# Patient Record
Sex: MALE | Race: BLACK OR AFRICAN AMERICAN | Employment: FULL TIME | URBAN - METROPOLITAN AREA
[De-identification: names, ages, dates, MRNs, and addresses within clinical notes are randomized per-mention and may not be internally consistent; named-entity substitution may affect disease eponyms.]

---

## 2018-03-01 ENCOUNTER — APPOINTMENT (OUTPATIENT)
Dept: GENERAL RADIOLOGY | Facility: HOSPITAL | Age: 33
DRG: 195 | End: 2018-03-01
Attending: EMERGENCY MEDICINE
Payer: MEDICAID

## 2018-03-01 ENCOUNTER — HOSPITAL ENCOUNTER (INPATIENT)
Facility: HOSPITAL | Age: 33
LOS: 2 days | Discharge: HOME | DRG: 195 | End: 2018-03-04
Attending: EMERGENCY MEDICINE | Admitting: HOSPITALIST
Payer: MEDICAID

## 2018-03-01 DIAGNOSIS — J18.9 MULTIFOCAL PNEUMONIA: Primary | ICD-10-CM

## 2018-03-01 LAB
BASOPHILS # BLD: 0.1 K/UL (ref 0–0.2)
BASOPHILS NFR BLD: 1 %
EOSINOPHIL # BLD: 0.1 K/UL (ref 0–0.7)
EOSINOPHIL NFR BLD: 1 %
ERYTHROCYTE [DISTWIDTH] IN BLOOD BY AUTOMATED COUNT: 13.6 % (ref 11–15)
HCT VFR BLD AUTO: 41 % (ref 41–52)
HGB BLD-MCNC: 13.8 G/DL (ref 13.5–17.5)
LYMPHOCYTES # BLD: 1.9 K/UL (ref 1–4)
LYMPHOCYTES NFR BLD: 20 %
MCH RBC QN AUTO: 29.2 PG (ref 27–32)
MCHC RBC AUTO-ENTMCNC: 33.8 G/DL (ref 32–37)
MCV RBC AUTO: 86.4 FL (ref 80–100)
MONOCYTES # BLD: 0.8 K/UL (ref 0–1)
MONOCYTES NFR BLD: 8 %
NEUTROPHILS # BLD AUTO: 6.7 K/UL (ref 1.8–7.7)
NEUTROPHILS NFR BLD: 70 %
PLATELET # BLD AUTO: 593 K/UL (ref 140–400)
PMV BLD AUTO: 6.6 FL (ref 7.4–10.3)
RBC # BLD AUTO: 4.74 M/UL (ref 4.5–5.9)
WBC # BLD AUTO: 9.6 K/UL (ref 4–11)

## 2018-03-01 PROCEDURE — 71046 X-RAY EXAM CHEST 2 VIEWS: CPT | Performed by: EMERGENCY MEDICINE

## 2018-03-01 RX ORDER — ACETAMINOPHEN AND CODEINE PHOSPHATE 300; 30 MG/1; MG/1
1 TABLET ORAL ONCE
Status: COMPLETED | OUTPATIENT
Start: 2018-03-01 | End: 2018-03-01

## 2018-03-02 ENCOUNTER — APPOINTMENT (OUTPATIENT)
Dept: CT IMAGING | Facility: HOSPITAL | Age: 33
DRG: 195 | End: 2018-03-02
Attending: HOSPITALIST
Payer: MEDICAID

## 2018-03-02 ENCOUNTER — APPOINTMENT (OUTPATIENT)
Dept: ULTRASOUND IMAGING | Facility: HOSPITAL | Age: 33
DRG: 195 | End: 2018-03-02
Attending: HOSPITALIST
Payer: MEDICAID

## 2018-03-02 PROBLEM — J18.9 MULTIFOCAL PNEUMONIA: Status: ACTIVE | Noted: 2018-03-02

## 2018-03-02 LAB
ALBUMIN SERPL BCP-MCNC: 2.7 G/DL (ref 3.5–4.8)
ALBUMIN SERPL BCP-MCNC: 3.1 G/DL (ref 3.5–4.8)
ALBUMIN/GLOB SERPL: 0.7 {RATIO} (ref 1–2)
ALBUMIN/GLOB SERPL: 0.7 {RATIO} (ref 1–2)
ALP SERPL-CCNC: 103 U/L (ref 32–100)
ALP SERPL-CCNC: 89 U/L (ref 32–100)
ALT SERPL-CCNC: 56 U/L (ref 17–63)
ALT SERPL-CCNC: 66 U/L (ref 17–63)
ANION GAP SERPL CALC-SCNC: 4 MMOL/L (ref 0–18)
ANION GAP SERPL CALC-SCNC: 7 MMOL/L (ref 0–18)
AST SERPL-CCNC: 42 U/L (ref 15–41)
AST SERPL-CCNC: 49 U/L (ref 15–41)
BACTERIA UR QL AUTO: NEGATIVE /HPF
BASOPHILS # BLD: 0 K/UL (ref 0–0.2)
BASOPHILS NFR BLD: 0 %
BETA STREP GRP A SCREEN: NEGATIVE
BILIRUB SERPL-MCNC: 0.5 MG/DL (ref 0.3–1.2)
BILIRUB SERPL-MCNC: 0.6 MG/DL (ref 0.3–1.2)
BILIRUB UR QL: NEGATIVE
BUN SERPL-MCNC: 6 MG/DL (ref 8–20)
BUN SERPL-MCNC: 6 MG/DL (ref 8–20)
BUN/CREAT SERPL: 6.1 (ref 10–20)
BUN/CREAT SERPL: 6.2 (ref 10–20)
CALCIUM SERPL-MCNC: 8 MG/DL (ref 8.5–10.5)
CALCIUM SERPL-MCNC: 8.5 MG/DL (ref 8.5–10.5)
CHLORIDE SERPL-SCNC: 100 MMOL/L (ref 95–110)
CHLORIDE SERPL-SCNC: 108 MMOL/L (ref 95–110)
CLARITY UR: CLEAR
CO2 SERPL-SCNC: 25 MMOL/L (ref 22–32)
CO2 SERPL-SCNC: 25 MMOL/L (ref 22–32)
COLOR UR: YELLOW
CREAT SERPL-MCNC: 0.97 MG/DL (ref 0.5–1.5)
CREAT SERPL-MCNC: 0.99 MG/DL (ref 0.5–1.5)
CRP SERPL-MCNC: 8.9 MG/DL (ref 0–0.9)
D DIMER PPP FEU-MCNC: 3.44 MCG/ML (ref ?–0.5)
EOSINOPHIL # BLD: 0.1 K/UL (ref 0–0.7)
EOSINOPHIL NFR BLD: 1 %
ERYTHROCYTE [DISTWIDTH] IN BLOOD BY AUTOMATED COUNT: 13.7 % (ref 11–15)
ERYTHROCYTE [SEDIMENTATION RATE] IN BLOOD: 94 MM/HR (ref 0–15)
GLOBULIN PLAS-MCNC: 3.9 G/DL (ref 2.5–3.7)
GLOBULIN PLAS-MCNC: 4.4 G/DL (ref 2.5–3.7)
GLUCOSE SERPL-MCNC: 102 MG/DL (ref 70–99)
GLUCOSE SERPL-MCNC: 98 MG/DL (ref 70–99)
GLUCOSE UR-MCNC: NEGATIVE MG/DL
HCT VFR BLD AUTO: 38.7 % (ref 41–52)
HETEROPH AB SER QL: NEGATIVE
HGB BLD-MCNC: 12.9 G/DL (ref 13.5–17.5)
HGB UR QL STRIP.AUTO: NEGATIVE
KETONES UR-MCNC: NEGATIVE MG/DL
L PNEUMO AG UR QL: NEGATIVE
LACTATE SERPL-SCNC: 1.2 MMOL/L (ref 0.5–2.2)
LEUKOCYTE ESTERASE UR QL STRIP.AUTO: NEGATIVE
LYMPHOCYTES # BLD: 2.1 K/UL (ref 1–4)
LYMPHOCYTES NFR BLD: 23 %
MAGNESIUM SERPL-MCNC: 2 MG/DL (ref 1.8–2.5)
MCH RBC QN AUTO: 29.2 PG (ref 27–32)
MCHC RBC AUTO-ENTMCNC: 33.3 G/DL (ref 32–37)
MCV RBC AUTO: 87.7 FL (ref 80–100)
MONOCYTES # BLD: 0.7 K/UL (ref 0–1)
MONOCYTES NFR BLD: 8 %
NEUTROPHILS # BLD AUTO: 6.2 K/UL (ref 1.8–7.7)
NEUTROPHILS NFR BLD: 68 %
NITRITE UR QL STRIP.AUTO: NEGATIVE
OSMOLALITY UR CALC.SUM OF ELEC: 272 MOSM/KG (ref 275–295)
OSMOLALITY UR CALC.SUM OF ELEC: 282 MOSM/KG (ref 275–295)
PH UR: 5 [PH] (ref 5–8)
PLATELET # BLD AUTO: 553 K/UL (ref 140–400)
PMV BLD AUTO: 6.3 FL (ref 7.4–10.3)
POTASSIUM SERPL-SCNC: 3.9 MMOL/L (ref 3.3–5.1)
POTASSIUM SERPL-SCNC: 3.9 MMOL/L (ref 3.3–5.1)
PROCALCITONIN SERPL-MCNC: <0.05 NG/ML (ref ?–0.11)
PROT SERPL-MCNC: 6.6 G/DL (ref 5.9–8.4)
PROT SERPL-MCNC: 7.5 G/DL (ref 5.9–8.4)
PROT UR-MCNC: NEGATIVE MG/DL
RBC # BLD AUTO: 4.41 M/UL (ref 4.5–5.9)
RBC #/AREA URNS AUTO: 1 /HPF
SODIUM SERPL-SCNC: 132 MMOL/L (ref 136–144)
SODIUM SERPL-SCNC: 137 MMOL/L (ref 136–144)
SP GR UR STRIP: 1.02 (ref 1–1.03)
STREP PNEUMO ANTIGEN, URINE: NEGATIVE
UROBILINOGEN UR STRIP-ACNC: 4
VIT C UR-MCNC: 20 MG/DL
WBC # BLD AUTO: 9.1 K/UL (ref 4–11)
WBC #/AREA URNS AUTO: <1 /HPF

## 2018-03-02 PROCEDURE — 99253 IP/OBS CNSLTJ NEW/EST LOW 45: CPT | Performed by: INTERNAL MEDICINE

## 2018-03-02 PROCEDURE — 93970 EXTREMITY STUDY: CPT | Performed by: HOSPITALIST

## 2018-03-02 PROCEDURE — 99223 1ST HOSP IP/OBS HIGH 75: CPT | Performed by: HOSPITALIST

## 2018-03-02 PROCEDURE — 71260 CT THORAX DX C+: CPT | Performed by: HOSPITALIST

## 2018-03-02 RX ORDER — HEPARIN SODIUM 5000 [USP'U]/ML
5000 INJECTION, SOLUTION INTRAVENOUS; SUBCUTANEOUS EVERY 12 HOURS SCHEDULED
Status: DISCONTINUED | OUTPATIENT
Start: 2018-03-02 | End: 2018-03-04

## 2018-03-02 RX ORDER — PANTOPRAZOLE SODIUM 40 MG/1
40 TABLET, DELAYED RELEASE ORAL
Status: DISCONTINUED | OUTPATIENT
Start: 2018-03-02 | End: 2018-03-04

## 2018-03-02 RX ORDER — ONDANSETRON 2 MG/ML
4 INJECTION INTRAMUSCULAR; INTRAVENOUS EVERY 4 HOURS PRN
Status: DISCONTINUED | OUTPATIENT
Start: 2018-03-02 | End: 2018-03-04

## 2018-03-02 RX ORDER — IPRATROPIUM BROMIDE AND ALBUTEROL SULFATE 2.5; .5 MG/3ML; MG/3ML
3 SOLUTION RESPIRATORY (INHALATION)
Status: DISCONTINUED | OUTPATIENT
Start: 2018-03-02 | End: 2018-03-02

## 2018-03-02 RX ORDER — SODIUM CHLORIDE 9 MG/ML
INJECTION, SOLUTION INTRAVENOUS CONTINUOUS
Status: DISPENSED | OUTPATIENT
Start: 2018-03-02 | End: 2018-03-02

## 2018-03-02 RX ORDER — ACETAMINOPHEN 325 MG/1
650 TABLET ORAL EVERY 6 HOURS PRN
Status: DISCONTINUED | OUTPATIENT
Start: 2018-03-02 | End: 2018-03-04

## 2018-03-02 RX ORDER — SODIUM CHLORIDE 9 MG/ML
INJECTION, SOLUTION INTRAVENOUS CONTINUOUS
Status: DISCONTINUED | OUTPATIENT
Start: 2018-03-02 | End: 2018-03-04

## 2018-03-02 RX ORDER — IPRATROPIUM BROMIDE AND ALBUTEROL SULFATE 2.5; .5 MG/3ML; MG/3ML
3 SOLUTION RESPIRATORY (INHALATION)
Status: DISCONTINUED | OUTPATIENT
Start: 2018-03-02 | End: 2018-03-04

## 2018-03-02 RX ORDER — AZITHROMYCIN 250 MG/1
500 TABLET, FILM COATED ORAL EVERY 24 HOURS
Status: DISCONTINUED | OUTPATIENT
Start: 2018-03-03 | End: 2018-03-04

## 2018-03-02 NOTE — PLAN OF CARE
Problem: Patient/Family Goals  Goal: Patient/Family Long Term Goal  Patient's Long Term Goal: to be discharged home  Interventions:  - implement MD orders    Monitor pt status  - See additional Care Plan goals for specific interventions   Outcome: Progress fall precautions as indicated by assessment.  - Educate pt/family on patient safety including physical limitations  - Instruct pt to call for assistance with activity based on assessment  - Modify environment to reduce risk of injury  - Provide assistive d Monitor for signs/symptoms of CO2 retention  Outcome: Progressing

## 2018-03-02 NOTE — ED PROVIDER NOTES
Patient Seen in: [facility] Emergency Department    History   Patient presents with:  Dyspnea IDRIS SOB (respiratory)    Stated Complaint: Flu-like symptoms     HPI    34 yo M without PMH/PSH presenting for evaluation of 4+ weeks of ongoing and interm normal. RLL rhonchi. .  Abdominal: Soft. Nontender. Musculoskeletal: No gross deformity. BLE with trace edema. Neurological: Alert. Skin: Skin is warm. Psychiatric: Cooperative. Nursing note and vitals reviewed.         ED Course     Labs Reviewed   C addressed. If you are not the intended recipient of this report, you are hereby notified that any copying, distribution, dissemination or action taken in relation to the contents of this report is strictly prohibited and may be unlawful.  If you have receiv

## 2018-03-02 NOTE — SEPSIS REASSESSMENT
[facility]  HOSPITALIST ADMIT NOTE    33year old male with no significant PMH  presents with polyarthralgias, cough fever chills . Available medical records partially reviewed. Dictation to follow.     Long Robles M.D.  3/2/2018

## 2018-03-02 NOTE — ED NOTES
Pt reports to ED with cold/flu like symptoms intermittently for the past month. Pt has had a dry cough, leg swelling, and fevers. Pt denies and N/V/D, has been eating and drinking fine. Pt states that the cough hurts his chest. Pt vitals WNL.

## 2018-03-02 NOTE — PROGRESS NOTES
[facility] Pharmacy Note: Route Optimization for Azithromycin [facility])    Patient is currently on Azithromycin (ZITHROMAX) 500 mg IV every 24 hours.    The patient meets the criteria to convert to the oral equivalent as established by the IV to Oral conversion

## 2018-03-02 NOTE — CONSULTS
Pulmonary/Critical Care Consult Note    HPI:   Riddhi Roberts is a 33year old male with Patient presents with:  Dyspnea IDRIS SOB (respiratory)    Keli Walshwesley    Pt is 34 yo with no PMH who was admitted with dx of PNA and abnl CXR.  Pt report cold 2 week (ISOVUE-M) 76 % injection 100 mL 100 mL Intravenous ONCE PRN   [COMPLETED] sodium chloride 0.9% IV bolus 1,000 mL 1,000 mL Intravenous Once   [COMPLETED] Acetaminophen-Codeine #3 (TYLENOL #3) 300-30 MG tab 1 tablet 1 tablet Oral Once           Allergies: + multifocal airspace opacities       ASSESSMENT/PLAN:     Cough, pleuritic CP, SOB  Also fever, aches/pain  Story c/w viral URI and then bacterial PNA  However the lack of WBC is intriguing as well as possible high risk behaviors and     The density a

## 2018-03-02 NOTE — H&P
[facility]    PATIENT'S NAME: Cristhian Neal PHYSICIAN: Yomaira Muñoz MD   PATIENT ACCOUNT#:   [id number]    LOCATION:  [address] [place] [address] RECORD #:   [id number]       YOB: 1985  ADMISSION DATE:       03/01/2 level was 1.2. His mono test was negative. Rapid strep screen was negative. Chest x-ray revealed extensive bilateral airspace disease more prominent on the left than on the right.   Pulmonary was consulted for assistance with management of the patient's There was no sinus tenderness. Mucous membranes were dry. Oropharynx was crowded. NECK:  Supple. LUNGS:  Bilateral scattered rhonchi. No wheezes currently, and the patient was taking shallow respirations. HEART:  Regular rate and rhythm.   Normal S1, strep as an etiology for his current respiratory illness. Urine for Legionella and mycoplasma studies were also sent. 5.   Obesity. Weight loss discussed.   The patient had been exercising at the gym but had been unable to do so because of the pain he wa

## 2018-03-02 NOTE — PROGRESS NOTES
[facility] Pharmacy Note: Antimicrobial Weight Dose Adjustment for: piperacillin/tazobactam (Sarah Chatterjee)    Macy Walden is a 33year old male who has been prescribed piperacillin/tazobactam (ZOSYN) 3.375 gm every 8 hours.   CrCl is estimated creatinine clearance i

## 2018-03-02 NOTE — PAYOR COMM NOTE
--------------  ADMISSION REVIEW     Payor: Payal Barragan #:  [id number]  Authorization Number: PENDING CLINICALS     Admit date: 3/2/18  Admit time: 0300       Patient Seen in: [facility] Emergency Department    History   Patient presents wit He does have phlegm, which he intentionally does not cough up because coughing hurts his chest when he does it.   He has felt hot and chilled at home with some shivering, and has had some sweating at night; but it does not sound like he has actually checked His appetite has been poor. He denies any melena, hematochezia, or hematuria. There were no additional pertinent positives or negatives on the 12-point review of systems except as listed in the History of Present Illness.       PHYSICAL EXAMINATION:    VI also obtain lower extremity venous Dopplers. Continue subcutaneous heparin for now, adjust as needed based on the above testing. 3.   Mildly elevated liver function tests. We will recheck in the morning. This elevation could represent sepsis.   The tiana (DUONEB) nebulizer solution 3 mL     Date Action Dose Route     3/2/2018 1354 Given 3 mL Nebulization       Pantoprazole Sodium (PROTONIX) EC tab 40 mg     Date Action Dose Route     3/2/2018 0910 Given 40 mg Oral       Piperacillin Sod-Tazobactam So (Dasilva Locket

## 2018-03-03 LAB
ANION GAP SERPL CALC-SCNC: 9 MMOL/L (ref 0–18)
BASOPHILS # BLD: 0.1 K/UL (ref 0–0.2)
BASOPHILS NFR BLD: 1 %
BUN SERPL-MCNC: 6 MG/DL (ref 8–20)
BUN/CREAT SERPL: 6.5 (ref 10–20)
CALCIUM SERPL-MCNC: 8.5 MG/DL (ref 8.5–10.5)
CHLORIDE SERPL-SCNC: 102 MMOL/L (ref 95–110)
CO2 SERPL-SCNC: 22 MMOL/L (ref 22–32)
CREAT SERPL-MCNC: 0.93 MG/DL (ref 0.5–1.5)
EOSINOPHIL # BLD: 0.1 K/UL (ref 0–0.7)
EOSINOPHIL NFR BLD: 1 %
ERYTHROCYTE [DISTWIDTH] IN BLOOD BY AUTOMATED COUNT: 13.9 % (ref 11–15)
GLUCOSE SERPL-MCNC: 101 MG/DL (ref 70–99)
HCT VFR BLD AUTO: 37.7 % (ref 41–52)
HGB BLD-MCNC: 12.7 G/DL (ref 13.5–17.5)
LYMPHOCYTES # BLD: 1.7 K/UL (ref 1–4)
LYMPHOCYTES NFR BLD: 18 %
MAGNESIUM SERPL-MCNC: 2.1 MG/DL (ref 1.8–2.5)
MCH RBC QN AUTO: 29.1 PG (ref 27–32)
MCHC RBC AUTO-ENTMCNC: 33.7 G/DL (ref 32–37)
MCV RBC AUTO: 86.3 FL (ref 80–100)
MONOCYTES # BLD: 0.8 K/UL (ref 0–1)
MONOCYTES NFR BLD: 8 %
NEUTROPHILS # BLD AUTO: 7.1 K/UL (ref 1.8–7.7)
NEUTROPHILS NFR BLD: 73 %
OSMOLALITY UR CALC.SUM OF ELEC: 274 MOSM/KG (ref 275–295)
PLATELET # BLD AUTO: 484 K/UL (ref 140–400)
PMV BLD AUTO: 6.1 FL (ref 7.4–10.3)
POTASSIUM SERPL-SCNC: 4.1 MMOL/L (ref 3.3–5.1)
RBC # BLD AUTO: 4.37 M/UL (ref 4.5–5.9)
SODIUM SERPL-SCNC: 133 MMOL/L (ref 136–144)
WBC # BLD AUTO: 9.7 K/UL (ref 4–11)

## 2018-03-03 PROCEDURE — 99232 SBSQ HOSP IP/OBS MODERATE 35: CPT | Performed by: INTERNAL MEDICINE

## 2018-03-03 PROCEDURE — 99233 SBSQ HOSP IP/OBS HIGH 50: CPT | Performed by: HOSPITALIST

## 2018-03-03 RX ORDER — 0.9 % SODIUM CHLORIDE 0.9 %
VIAL (ML) INJECTION
Status: COMPLETED
Start: 2018-03-03 | End: 2018-03-03

## 2018-03-03 NOTE — PLAN OF CARE
Problem: Patient/Family Goals  Goal: Patient/Family Long Term Goal  Patient's Long Term Goal: to be discharged home  Interventions:  - implement MD orders    Monitor pt status  - See additional Care Plan goals for specific interventions    Outcome: Progres [place] fall precautions as indicated by assessment.  - Educate pt/family on patient safety including physical limitations  - Instruct pt to call for assistance with activity based on assessment  - Modify environment to reduce risk of injury  - Provide a

## 2018-03-03 NOTE — PROGRESS NOTES
[facility]    Progress Note      Assessment and Plan:   1. Multifocal pneumonitis–patient has bilateral left greater than right infiltrates with a normal white blood cell count but high sedimentation rate.   He has received a day of antibio

## 2018-03-03 NOTE — PROGRESS NOTES
[facility]    Progress Note    Mirna Henriquez Patient Status:  Inpatient    1985 MRN [id number]   Location [facility] 5SW/SE Attending Tereza Frederick MD   Hosp Day # 1 PCP STEVIE Koch       Subjective:   Mirna Henriquez (cpt=[id number])    Result Date: 3/2/2018  CONCLUSION:  1. Extensive bilateral airspace disease left more than right as discussed above suggesting bilateral pneumonia. Correlate clinically and followup studies are advised.    The examination was read on call by

## 2018-03-04 VITALS
HEART RATE: 99 BPM | HEIGHT: 72 IN | RESPIRATION RATE: 20 BRPM | WEIGHT: 272.31 LBS | OXYGEN SATURATION: 98 % | BODY MASS INDEX: 36.88 KG/M2 | TEMPERATURE: 97 F | DIASTOLIC BLOOD PRESSURE: 71 MMHG | SYSTOLIC BLOOD PRESSURE: 122 MMHG

## 2018-03-04 LAB
ANION GAP SERPL CALC-SCNC: 7 MMOL/L (ref 0–18)
BASOPHILS # BLD: 0 K/UL (ref 0–0.2)
BASOPHILS NFR BLD: 0 %
BUN SERPL-MCNC: 4 MG/DL (ref 8–20)
BUN/CREAT SERPL: 4.3 (ref 10–20)
CALCIUM SERPL-MCNC: 8.4 MG/DL (ref 8.5–10.5)
CHLORIDE SERPL-SCNC: 104 MMOL/L (ref 95–110)
CO2 SERPL-SCNC: 22 MMOL/L (ref 22–32)
CREAT SERPL-MCNC: 0.94 MG/DL (ref 0.5–1.5)
EOSINOPHIL # BLD: 0.1 K/UL (ref 0–0.7)
EOSINOPHIL NFR BLD: 1 %
ERYTHROCYTE [DISTWIDTH] IN BLOOD BY AUTOMATED COUNT: 14 % (ref 11–15)
GLUCOSE SERPL-MCNC: 104 MG/DL (ref 70–99)
HCT VFR BLD AUTO: 36.4 % (ref 41–52)
HGB BLD-MCNC: 12.5 G/DL (ref 13.5–17.5)
LYMPHOCYTES # BLD: 1.9 K/UL (ref 1–4)
LYMPHOCYTES NFR BLD: 19 %
MAGNESIUM SERPL-MCNC: 1.9 MG/DL (ref 1.8–2.5)
MCH RBC QN AUTO: 29.5 PG (ref 27–32)
MCHC RBC AUTO-ENTMCNC: 34.2 G/DL (ref 32–37)
MCV RBC AUTO: 86.3 FL (ref 80–100)
MONOCYTES # BLD: 0.6 K/UL (ref 0–1)
MONOCYTES NFR BLD: 6 %
NEUTROPHILS # BLD AUTO: 7.5 K/UL (ref 1.8–7.7)
NEUTROPHILS NFR BLD: 74 %
OSMOLALITY UR CALC.SUM OF ELEC: 273 MOSM/KG (ref 275–295)
PLATELET # BLD AUTO: 467 K/UL (ref 140–400)
PMV BLD AUTO: 6 FL (ref 7.4–10.3)
POTASSIUM SERPL-SCNC: 4 MMOL/L (ref 3.3–5.1)
RBC # BLD AUTO: 4.22 M/UL (ref 4.5–5.9)
SODIUM SERPL-SCNC: 133 MMOL/L (ref 136–144)
WBC # BLD AUTO: 10.1 K/UL (ref 4–11)

## 2018-03-04 PROCEDURE — 99239 HOSP IP/OBS DSCHRG MGMT >30: CPT | Performed by: HOSPITALIST

## 2018-03-04 PROCEDURE — 99232 SBSQ HOSP IP/OBS MODERATE 35: CPT | Performed by: INTERNAL MEDICINE

## 2018-03-04 RX ORDER — AMOXICILLIN AND CLAVULANATE POTASSIUM 875; 125 MG/1; MG/1
1 TABLET, FILM COATED ORAL 2 TIMES DAILY
Qty: 14 TABLET | Refills: 0 | Status: SHIPPED | OUTPATIENT
Start: 2018-03-04 | End: 2018-03-14

## 2018-03-04 RX ORDER — ALBUTEROL SULFATE 90 UG/1
2 AEROSOL, METERED RESPIRATORY (INHALATION) EVERY 6 HOURS PRN
Qty: 1 INHALER | Refills: 0 | Status: SHIPPED | OUTPATIENT
Start: 2018-03-04

## 2018-03-04 RX ORDER — IPRATROPIUM BROMIDE AND ALBUTEROL SULFATE 2.5; .5 MG/3ML; MG/3ML
3 SOLUTION RESPIRATORY (INHALATION)
Status: DISCONTINUED | OUTPATIENT
Start: 2018-03-04 | End: 2018-03-04

## 2018-03-04 RX ORDER — LEVOFLOXACIN 750 MG/1
750 TABLET ORAL DAILY
Qty: 7 TABLET | Refills: 0 | Status: SHIPPED | OUTPATIENT
Start: 2018-03-04 | End: 2018-03-04

## 2018-03-04 RX ORDER — AZITHROMYCIN 250 MG/1
250 TABLET, FILM COATED ORAL DAILY
Qty: 1 PACKAGE | Refills: 0 | Status: SHIPPED | OUTPATIENT
Start: 2018-03-04

## 2018-03-04 RX ORDER — IPRATROPIUM BROMIDE AND ALBUTEROL SULFATE 2.5; .5 MG/3ML; MG/3ML
3 SOLUTION RESPIRATORY (INHALATION) EVERY 6 HOURS PRN
Status: DISCONTINUED | OUTPATIENT
Start: 2018-03-04 | End: 2018-03-04

## 2018-03-04 RX ORDER — AZITHROMYCIN 250 MG/1
500 TABLET, FILM COATED ORAL DAILY
Status: DISCONTINUED | OUTPATIENT
Start: 2018-03-04 | End: 2018-03-04

## 2018-03-04 NOTE — DISCHARGE SUMMARY
[facility]    Discharge Summary    Luly Nassar Patient Status:  Inpatient    1985 MRN [id number]   Location [facility] 5SW/SE Attending Petra Beard MD   Hosp Day # 2 PCP Marcella Deutsch     Date of Admission: 3/1/201 have phlegm, which he intentionally does not cough up because coughing hurts his chest when he does it.   He has felt hot and chilled at home with some shivering, and has had some sweating at night; but it does not sound like he has actually checked his tem n/a    Discharge Condition: Good    Discharge Medications:      Discharge Medications      START taking these medications      Instructions Prescription details   Albuterol Sulfate  (90 Base) MCG/ACT Aers      Inhale 2 puffs into the lungs every 6 (

## 2018-03-04 NOTE — PLAN OF CARE
Problem: Patient/Family Goals  Goal: Patient/Family Long Term Goal  Patient's Long Term Goal: to be discharged home  Interventions:  - implement MD orders    Monitor pt status  - See additional Care Plan goals for specific interventions    Outcome: Progres [place] fall precautions as indicated by assessment.  - Educate pt/family on patient safety including physical limitations  - Instruct pt to call for assistance with activity based on assessment  - Modify environment to reduce risk of injury  - Provide a anxiety  - Monitor for signs/symptoms of CO2 retention   Outcome: Progressing

## 2018-03-04 NOTE — PLAN OF CARE
Problem: Patient/Family Goals  Goal: Patient/Family Long Term Goal  Patient's Long Term Goal: to be discharged home  Interventions:  - implement MD orders    Monitor pt status  - See additional Care Plan goals for specific interventions    Outcome: Progres [place] fall precautions as indicated by assessment.  - Educate pt/family on patient safety including physical limitations  - Instruct pt to call for assistance with activity based on assessment  - Modify environment to reduce risk of injury  - Provide a anxiety  - Monitor for signs/symptoms of CO2 retention   Outcome: Progressing      Comments: States breathing is better today, no fevers. Continues on po zithromax and IV zosyn.

## 2018-03-04 NOTE — PROGRESS NOTES
[facility]    Progress Note      Assessment and Plan:   1. Multifocal pneumonitis– the patient is breathing more comfortably. He still has a couple left basilar crackles.   He otherwise feels well enough to go home and I think is reasonabl

## 2018-03-04 NOTE — PLAN OF CARE
Problem: Patient/Family Goals  Goal: Patient/Family Long Term Goal  Patient's Long Term Goal: to be discharged home  Interventions:  - implement MD orders    Monitor pt status  - See additional Care Plan goals for specific interventions    Outcome: Adequat behaviors that affect risk of falls.   - [place] fall precautions as indicated by assessment.  - Educate pt/family on patient safety including physical limitations  - Instruct pt to call for assistance with activity based on assessment  - Modify environme Respiratory Therapy support as indicated  - Manage/alleviate anxiety  - Monitor for signs/symptoms of CO2 retention   Outcome: Adequate for Discharge      Comments: Alfred Potter states his breathing is \"good\" today and it \"was easy to breath\" when he woke up

## 2018-03-04 NOTE — PROGRESS NOTES
[facility]    Progress Note    Keyona Bryant Patient Status:  Inpatient    1985 MRN [id number]   Location [facility] 5SW/SE Attending Vidal Dale MD   Hosp Day # 2 PCP STEVIE Henson       Subjective:   Keyona Bryant 3/2/2018  CONCLUSION: There is no evidence of occlusive or nonocclusive thrombosis in the visualized portions  of the deep venous systems in both lower extremities.          Ct Chest Pain/pe (iv Only) Em    Result Date: 3/2/2018  CONCLUSION:  1. Multifocal

## 2018-03-05 ENCOUNTER — TELEPHONE (OUTPATIENT)
Dept: MEDSURG UNIT | Facility: HOSPITAL | Age: 33
End: 2018-03-05

## 2018-03-05 LAB
MAYO HISTOPLASMA AG RESULT: NEGATIVE
MAYO HISTOPLASMA AG VALUE: 0 NG/ML

## 2018-03-05 NOTE — PAYOR COMM NOTE
--------------  DISCHARGE REVIEW    Secondary Payor: N/A  Subscriber #:    Secondary Payor Authorization Number: N/A      Admit date: 3/2/18  Admit time:  0300  Discharge Date: 3/4/2018  1:22 PM     Admitting Physician: Valerie Shah MD  Attending edema  Psychiatric: calm        History of Present Illness:[FG.2]   Per Dr. Kelsi Preston:  This is a very pleasant, 81-year-old gentleman with no chronic medical problems.   He denies any history of diabetes, high blood pressure, whether or not we should empirically start vancomycin for the possibility of a postviral pneumonia. He discussed this with Pulmonary, and the recommendation was to continue Zosyn and Zithromax.   The patient was admitted to remote telemetry for further ede AM   Attribution Key     FG. 1 - Kalia Pat MD on 3/4/2018  9:22 AM   FG. 2 - Kalia Pat MD on 3/4/2018  9:23 AM   FG. 3 - Kalia Pat MD on 3/4/2018  9:43 AM                    REVIEWER COMMENTS

## 2018-03-05 NOTE — PAYOR COMM NOTE
3/3    Filed: 3/3/2018  4:31 PM Date of Service: 3/3/2018  4:27 PM Status: Signed   : Christophe Arambula MD (Physician)   []Manual[]Template  []Copied  [facility]     Progress Note           Kathy Flores Patient Status:  Inpatie AST 42 (H) 03/02/2018   ALT 56 03/02/2018   DDIMER 3.44 (H) 03/02/2018   ESRML 94 (H) 03/02/2018   CRP 8.9 (H) 03/02/2018   MG 2.1 03/03/2018         Xr Chest Pa + Lat Chest ([id number])     Result Date: 3/2/2018  CONCLUSION:  1.  Extensive bilateral airspac Recommendations: Okay to discharge today with course of Levaquin. I discussed with primary care. The patient should see Dr. Venkat Kingston in the office in 1 week with a repeat chest x-ray. Await antineutrophil cytoplasmic antibodies.     2.   DVT prophyl

## 2018-03-07 LAB
MYELOPEROX ANTIBODIES, IGG: 0 AU/ML
SERINE PROTEASE3, IGG: 0 AU/ML

## 2018-12-26 ENCOUNTER — HOSPITAL ENCOUNTER (EMERGENCY)
Facility: HOSPITAL | Age: 33
Discharge: HOME | End: 2018-12-26
Attending: PHYSICIAN ASSISTANT
Payer: MEDICAID

## 2018-12-26 VITALS
RESPIRATION RATE: 18 BRPM | BODY MASS INDEX: 36.87 KG/M2 | OXYGEN SATURATION: 98 % | HEIGHT: 72 IN | SYSTOLIC BLOOD PRESSURE: 153 MMHG | TEMPERATURE: 97 F | DIASTOLIC BLOOD PRESSURE: 98 MMHG | WEIGHT: 272.25 LBS | HEART RATE: 76 BPM

## 2018-12-26 DIAGNOSIS — R36.9 PENILE DISCHARGE: Primary | ICD-10-CM

## 2018-12-26 DIAGNOSIS — Z20.2 POSSIBLE EXPOSURE TO STD: ICD-10-CM

## 2018-12-26 DIAGNOSIS — R03.0 ELEVATED BLOOD PRESSURE READING: ICD-10-CM

## 2018-12-26 DIAGNOSIS — N39.0 URINARY TRACT INFECTION WITHOUT HEMATURIA, SITE UNSPECIFIED: ICD-10-CM

## 2018-12-26 PROCEDURE — 99284 EMERGENCY DEPT VISIT MOD MDM: CPT

## 2018-12-26 PROCEDURE — 87086 URINE CULTURE/COLONY COUNT: CPT | Performed by: PHYSICIAN ASSISTANT

## 2018-12-26 PROCEDURE — 81001 URINALYSIS AUTO W/SCOPE: CPT

## 2018-12-26 PROCEDURE — 87086 URINE CULTURE/COLONY COUNT: CPT

## 2018-12-26 PROCEDURE — 87491 CHLMYD TRACH DNA AMP PROBE: CPT | Performed by: PHYSICIAN ASSISTANT

## 2018-12-26 PROCEDURE — 81001 URINALYSIS AUTO W/SCOPE: CPT | Performed by: PHYSICIAN ASSISTANT

## 2018-12-26 PROCEDURE — 96372 THER/PROPH/DIAG INJ SC/IM: CPT

## 2018-12-26 PROCEDURE — 87591 N.GONORRHOEAE DNA AMP PROB: CPT | Performed by: PHYSICIAN ASSISTANT

## 2018-12-26 RX ORDER — CEPHALEXIN 500 MG/1
500 CAPSULE ORAL 3 TIMES DAILY
Qty: 21 CAPSULE | Refills: 0 | Status: SHIPPED | OUTPATIENT
Start: 2018-12-26 | End: 2019-01-02

## 2018-12-26 RX ORDER — AZITHROMYCIN 250 MG/1
1000 TABLET, FILM COATED ORAL ONCE
Status: COMPLETED | OUTPATIENT
Start: 2018-12-26 | End: 2018-12-26

## 2018-12-27 NOTE — ED NOTES
Pt  2 days ago began having pelvic pain and urinary frequency, dysuria and urgency. [facility] also has been having white penile discharge. [facility] has been sexually active recently.

## 2018-12-27 NOTE — ED PROVIDER NOTES
Patient Seen in: [facility]    History   Patient presents with:  Male  Problem    Stated Complaint: Penis pain, urinary symptoms    HPI    Gage Villareal is a 29year old male who presents with chief complaint of dysuria. 100 %   O2 Device None (Room air)       Current:BP (!) 153/98   Pulse 76   Temp 97.4 °F (36.3 °C) (Oral)   Resp 18   Ht 182.9 cm (6')   Wt 123.5 kg   SpO2 98%   BMI 36.93 kg/m²     PULSE OX within normal limits on room air as interpreted by this provider. All other components within normal limits   URINE CULTURE, ROUTINE   CHLAMYDIA/GONOCOCCUS, JENELLE       MDM       Physical exam remained stable over serial reexaminations as previously documented.   Results reviewed and need for follow-up discussed with mayi

## 2020-06-24 ENCOUNTER — APPOINTMENT (OUTPATIENT)
Dept: GENERAL RADIOLOGY | Facility: HOSPITAL | Age: 35
End: 2020-06-24
Attending: PHYSICIAN ASSISTANT
Payer: MEDICAID

## 2020-06-24 PROCEDURE — 71045 X-RAY EXAM CHEST 1 VIEW: CPT | Performed by: PHYSICIAN ASSISTANT

## 2020-06-24 NOTE — ED NOTES
Patient reports taking Risperdal at 2100 and 0400, then drank a monster at 0700. Patient reports that he felt dazed and SOB. Patient denies symptoms at this time. Patient is alert, oriented x4, in no apparent distress. Will continue to monitor.

## 2020-06-24 NOTE — ED PROVIDER NOTES
Patient Seen in: [facility] Emergency Department    History   Patient presents with: Anxiety/Panic attack    Stated Complaint: allergic reation to meds     HPI    78-year-old male presents with chief complaint of anxiety. Onset 0700 today.   Mckayla allergic reation to meds   Other systems are as noted in HPI. Constitutional and vital signs reviewed. All other systems reviewed and negative except as noted above. PSFH elements reviewed from today and agreed except as otherwise stated in HPI. dry.  No obvious bruising. No obvious rash. ED Course     Labs Reviewed   RAPID SARS-COV-2 BY PCR - Normal     EKG    Rate, intervals and axes as noted on EKG Report.   Rate: 75  Rhythm: Sinus Rhythm  Reading: No STEMI, interpreted by ED physician

## 2020-06-24 NOTE — ED INITIAL ASSESSMENT (HPI)
Pt states he took Risperdal today at 4am ad went to work for the first time in a month. Pt believes he's having a reaction to medication because he felt like he was sleepwalking felt nauseous and SOB.

## 2021-12-28 ENCOUNTER — APPOINTMENT (OUTPATIENT)
Dept: CT IMAGING | Facility: HOSPITAL | Age: 36
End: 2021-12-28
Attending: EMERGENCY MEDICINE
Payer: MEDICAID

## 2021-12-28 ENCOUNTER — HOSPITAL ENCOUNTER (EMERGENCY)
Facility: HOSPITAL | Age: 36
Discharge: HOME | End: 2021-12-28
Attending: EMERGENCY MEDICINE
Payer: MEDICAID

## 2021-12-28 VITALS
SYSTOLIC BLOOD PRESSURE: 138 MMHG | BODY MASS INDEX: 34.54 KG/M2 | DIASTOLIC BLOOD PRESSURE: 91 MMHG | TEMPERATURE: 98 F | WEIGHT: 255 LBS | HEART RATE: 76 BPM | HEIGHT: 72 IN | RESPIRATION RATE: 20 BRPM | OXYGEN SATURATION: 99 %

## 2021-12-28 DIAGNOSIS — L03.211 CELLULITIS OF FACE: Primary | ICD-10-CM

## 2021-12-28 LAB
ANION GAP SERPL CALC-SCNC: 7 MMOL/L (ref 0–18)
BASOPHILS # BLD AUTO: 0.02 X10(3) UL (ref 0–0.2)
BASOPHILS NFR BLD AUTO: 0.3 %
BUN BLD-MCNC: 11 MG/DL (ref 7–18)
BUN/CREAT SERPL: 10 (ref 10–20)
CALCIUM BLD-MCNC: 9.4 MG/DL (ref 8.5–10.1)
CHLORIDE SERPL-SCNC: 101 MMOL/L (ref 98–112)
CO2 SERPL-SCNC: 29 MMOL/L (ref 21–32)
CREAT BLD-MCNC: 1.1 MG/DL
DEPRECATED RDW RBC AUTO: 40 FL (ref 35.1–46.3)
EOSINOPHIL # BLD AUTO: 0.06 X10(3) UL (ref 0–0.7)
EOSINOPHIL NFR BLD AUTO: 0.8 %
ERYTHROCYTE [DISTWIDTH] IN BLOOD BY AUTOMATED COUNT: 11.9 % (ref 11–15)
GLUCOSE BLD-MCNC: 96 MG/DL (ref 70–99)
HCT VFR BLD AUTO: 45.6 %
HGB BLD-MCNC: 15.3 G/DL
IMM GRANULOCYTES # BLD AUTO: 0.02 X10(3) UL (ref 0–1)
IMM GRANULOCYTES NFR BLD: 0.3 %
LYMPHOCYTES # BLD AUTO: 2.18 X10(3) UL (ref 1–4)
LYMPHOCYTES NFR BLD AUTO: 30.2 %
MCH RBC QN AUTO: 30.5 PG (ref 26–34)
MCHC RBC AUTO-ENTMCNC: 33.6 G/DL (ref 31–37)
MCV RBC AUTO: 91 FL
MONOCYTES # BLD AUTO: 0.42 X10(3) UL (ref 0.1–1)
MONOCYTES NFR BLD AUTO: 5.8 %
NEUTROPHILS # BLD AUTO: 4.53 X10 (3) UL (ref 1.5–7.7)
NEUTROPHILS # BLD AUTO: 4.53 X10(3) UL (ref 1.5–7.7)
NEUTROPHILS NFR BLD AUTO: 62.6 %
OSMOLALITY SERPL CALC.SUM OF ELEC: 283 MOSM/KG (ref 275–295)
PLATELET # BLD AUTO: 337 10(3)UL (ref 150–450)
POTASSIUM SERPL-SCNC: 4 MMOL/L (ref 3.5–5.1)
RBC # BLD AUTO: 5.01 X10(6)UL
SODIUM SERPL-SCNC: 137 MMOL/L (ref 136–145)
WBC # BLD AUTO: 7.2 X10(3) UL (ref 4–11)

## 2021-12-28 PROCEDURE — 87040 BLOOD CULTURE FOR BACTERIA: CPT | Performed by: EMERGENCY MEDICINE

## 2021-12-28 PROCEDURE — 85025 COMPLETE CBC W/AUTO DIFF WBC: CPT | Performed by: EMERGENCY MEDICINE

## 2021-12-28 PROCEDURE — 36415 COLL VENOUS BLD VENIPUNCTURE: CPT

## 2021-12-28 PROCEDURE — 80048 BASIC METABOLIC PNL TOTAL CA: CPT | Performed by: EMERGENCY MEDICINE

## 2021-12-28 PROCEDURE — 70491 CT SOFT TISSUE NECK W/DYE: CPT | Performed by: EMERGENCY MEDICINE

## 2021-12-28 PROCEDURE — 96374 THER/PROPH/DIAG INJ IV PUSH: CPT

## 2021-12-28 PROCEDURE — 99284 EMERGENCY DEPT VISIT MOD MDM: CPT

## 2021-12-28 RX ORDER — AMOXICILLIN AND CLAVULANATE POTASSIUM 875; 125 MG/1; MG/1
1 TABLET, FILM COATED ORAL 2 TIMES DAILY
Qty: 20 TABLET | Refills: 0 | Status: SHIPPED | OUTPATIENT
Start: 2021-12-28 | End: 2022-01-07

## 2021-12-28 NOTE — ED INITIAL ASSESSMENT (HPI)
Patient from home with increased swelling from a \"pimple\" to the right side of his face since [place].

## 2021-12-28 NOTE — ED PROVIDER NOTES
Patient Seen in: [facility] Emergency Department      History   Patient presents with:  Abscess    Stated Complaint: swelling/cellulitis    Subjective:   HPI    44-year-old male presents for evaluation of right-sided facial infection.   Patient rep induration, no obvious dentition issues  Eyes:      Conjunctiva/sclera: Conjunctivae normal.   Cardiovascular:      Rate and Rhythm: Normal rate. Pulmonary:      Effort: Pulmonary effort is normal. No respiratory distress.    Musculoskeletal:         Gene tonsils. ORAL CAVITY/TONGUE: No visible mass or significant asymmetry. COMPARTMENTS: Marked subcutaneous inflammatory stranding throughout the     right chin and overlying the right mandibular body.   Inflammation extends     to the anterior tip of of the right submandibular gland. Inflammation extends to the anterior tip of the digastric muscle, but does     not involve the floor of the     mouth. No associated radiopaque salivary gland calculus or     periapical/periodontal dental disease.   C Patric Pedro MD  [address] [place] [address]  [phone]    Schedule an appointment as soon as possible for a visit in 1 week  For follow up          Medications Prescribed:  Discharge Medication List as of 12/28/2021  2:58 PM    START t

## 2022-01-08 ENCOUNTER — IMMUNIZATION (OUTPATIENT)
Dept: LAB | Facility: HOSPITAL | Age: 37
End: 2022-01-08
Attending: EMERGENCY MEDICINE
Payer: MEDICAID

## 2022-01-08 DIAGNOSIS — Z23 NEED FOR VACCINATION: Primary | ICD-10-CM

## 2022-01-08 PROCEDURE — 0064A SARSCOV2 VAC 50MCG/0.25ML IM: CPT

## 2022-07-14 ENCOUNTER — HOSPITAL ENCOUNTER (EMERGENCY)
Facility: HOSPITAL | Age: 37
Discharge: HOME | End: 2022-07-14
Payer: MEDICAID

## 2022-07-14 VITALS
TEMPERATURE: 98 F | BODY MASS INDEX: 35 KG/M2 | SYSTOLIC BLOOD PRESSURE: 136 MMHG | HEART RATE: 88 BPM | WEIGHT: 260 LBS | OXYGEN SATURATION: 98 % | RESPIRATION RATE: 16 BRPM | DIASTOLIC BLOOD PRESSURE: 100 MMHG

## 2022-07-14 DIAGNOSIS — K52.9 ACUTE GASTROENTERITIS: Primary | ICD-10-CM

## 2022-07-14 LAB
ALBUMIN SERPL-MCNC: 4 G/DL (ref 3.4–5)
ALP LIVER SERPL-CCNC: 73 U/L
ALT SERPL-CCNC: 47 U/L
ANION GAP SERPL CALC-SCNC: 5 MMOL/L (ref 0–18)
AST SERPL-CCNC: 34 U/L (ref 15–37)
BASOPHILS # BLD AUTO: 0.02 X10(3) UL (ref 0–0.2)
BASOPHILS NFR BLD AUTO: 0.5 %
BILIRUB DIRECT SERPL-MCNC: 0.1 MG/DL (ref 0–0.2)
BILIRUB SERPL-MCNC: 0.5 MG/DL (ref 0.1–2)
BILIRUB UR QL: NEGATIVE
BUN BLD-MCNC: 11 MG/DL (ref 7–18)
BUN/CREAT SERPL: 9.6 (ref 10–20)
CALCIUM BLD-MCNC: 8.8 MG/DL (ref 8.5–10.1)
CHLORIDE SERPL-SCNC: 106 MMOL/L (ref 98–112)
CLARITY UR: CLEAR
CO2 SERPL-SCNC: 24 MMOL/L (ref 21–32)
COLOR UR: YELLOW
CREAT BLD-MCNC: 1.14 MG/DL
DEPRECATED RDW RBC AUTO: 42 FL (ref 35.1–46.3)
EOSINOPHIL # BLD AUTO: 0.02 X10(3) UL (ref 0–0.7)
EOSINOPHIL NFR BLD AUTO: 0.5 %
ERYTHROCYTE [DISTWIDTH] IN BLOOD BY AUTOMATED COUNT: 12.2 % (ref 11–15)
GLUCOSE BLD-MCNC: 81 MG/DL (ref 70–99)
GLUCOSE UR-MCNC: NEGATIVE MG/DL
HCT VFR BLD AUTO: 48.2 %
HGB BLD-MCNC: 16 G/DL
IMM GRANULOCYTES # BLD AUTO: 0.01 X10(3) UL (ref 0–1)
IMM GRANULOCYTES NFR BLD: 0.2 %
KETONES UR-MCNC: NEGATIVE MG/DL
LEUKOCYTE ESTERASE UR QL STRIP.AUTO: NEGATIVE
LIPASE SERPL-CCNC: 119 U/L (ref 73–393)
LYMPHOCYTES # BLD AUTO: 1.95 X10(3) UL (ref 1–4)
LYMPHOCYTES NFR BLD AUTO: 46.7 %
MCH RBC QN AUTO: 31.6 PG (ref 26–34)
MCHC RBC AUTO-ENTMCNC: 33.2 G/DL (ref 31–37)
MCV RBC AUTO: 95.1 FL
MONOCYTES # BLD AUTO: 0.34 X10(3) UL (ref 0.1–1)
MONOCYTES NFR BLD AUTO: 8.1 %
NEUTROPHILS # BLD AUTO: 1.84 X10 (3) UL (ref 1.5–7.7)
NEUTROPHILS # BLD AUTO: 1.84 X10(3) UL (ref 1.5–7.7)
NEUTROPHILS NFR BLD AUTO: 44 %
NITRITE UR QL STRIP.AUTO: NEGATIVE
OSMOLALITY SERPL CALC.SUM OF ELEC: 278 MOSM/KG (ref 275–295)
PH UR: 5.5 [PH] (ref 5–8)
PLATELET # BLD AUTO: 302 10(3)UL (ref 150–450)
POTASSIUM SERPL-SCNC: 3.8 MMOL/L (ref 3.5–5.1)
PROT SERPL-MCNC: 7.6 G/DL (ref 6.4–8.2)
PROT UR-MCNC: NEGATIVE MG/DL
RBC # BLD AUTO: 5.07 X10(6)UL
SODIUM SERPL-SCNC: 135 MMOL/L (ref 136–145)
SP GR UR STRIP: >=1.03 (ref 1–1.03)
UROBILINOGEN UR STRIP-ACNC: 0.2
WBC # BLD AUTO: 4.2 X10(3) UL (ref 4–11)

## 2022-07-14 PROCEDURE — 85025 COMPLETE CBC W/AUTO DIFF WBC: CPT | Performed by: NURSE PRACTITIONER

## 2022-07-14 PROCEDURE — 99284 EMERGENCY DEPT VISIT MOD MDM: CPT

## 2022-07-14 PROCEDURE — 81015 MICROSCOPIC EXAM OF URINE: CPT | Performed by: NURSE PRACTITIONER

## 2022-07-14 PROCEDURE — 96361 HYDRATE IV INFUSION ADD-ON: CPT

## 2022-07-14 PROCEDURE — 81001 URINALYSIS AUTO W/SCOPE: CPT | Performed by: NURSE PRACTITIONER

## 2022-07-14 PROCEDURE — 80048 BASIC METABOLIC PNL TOTAL CA: CPT | Performed by: NURSE PRACTITIONER

## 2022-07-14 PROCEDURE — 80076 HEPATIC FUNCTION PANEL: CPT | Performed by: NURSE PRACTITIONER

## 2022-07-14 PROCEDURE — 83690 ASSAY OF LIPASE: CPT | Performed by: NURSE PRACTITIONER

## 2022-07-14 PROCEDURE — 96374 THER/PROPH/DIAG INJ IV PUSH: CPT

## 2022-07-14 RX ORDER — ONDANSETRON 2 MG/ML
4 INJECTION INTRAMUSCULAR; INTRAVENOUS ONCE
Status: COMPLETED | OUTPATIENT
Start: 2022-07-14 | End: 2022-07-14

## 2022-07-14 RX ORDER — BUSPIRONE HYDROCHLORIDE 5 MG/1
5 TABLET ORAL 3 TIMES DAILY
COMMUNITY

## 2022-07-14 RX ORDER — LISINOPRIL 10 MG/1
10 TABLET ORAL DAILY
COMMUNITY

## 2022-07-14 NOTE — ED INITIAL ASSESSMENT (HPI)
PT TO ER STATES HE HAS BEEN HAVING NAUSEA, VOMITING, AND DIARRHEA. PT STATES HE IS ALSO HAVING LEFT SIDE PAIN. PT STATES HE HAS BEEN HAVING THESE SYMPTOMS SINCE Tuesday EVENING. PT DENIES FEVERS.

## 2023-07-21 ENCOUNTER — ANESTHESIA EVENT (OUTPATIENT)
Dept: SURGERY | Facility: HOSPITAL | Age: 38
End: 2023-07-21
Payer: COMMERCIAL

## 2023-07-21 ENCOUNTER — APPOINTMENT (OUTPATIENT)
Dept: GENERAL RADIOLOGY | Facility: HOSPITAL | Age: 38
End: 2023-07-21
Attending: ORTHOPAEDIC SURGERY
Payer: COMMERCIAL

## 2023-07-21 ENCOUNTER — HOSPITAL ENCOUNTER (OUTPATIENT)
Facility: HOSPITAL | Age: 38
Setting detail: HOSPITAL OUTPATIENT SURGERY
Discharge: HOME | End: 2023-07-21
Attending: ORTHOPAEDIC SURGERY | Admitting: ORTHOPAEDIC SURGERY
Payer: COMMERCIAL

## 2023-07-21 ENCOUNTER — ANESTHESIA (OUTPATIENT)
Dept: SURGERY | Facility: HOSPITAL | Age: 38
End: 2023-07-21
Payer: COMMERCIAL

## 2023-07-21 VITALS
WEIGHT: 222.25 LBS | DIASTOLIC BLOOD PRESSURE: 92 MMHG | SYSTOLIC BLOOD PRESSURE: 153 MMHG | HEIGHT: 72 IN | OXYGEN SATURATION: 100 % | HEART RATE: 87 BPM | RESPIRATION RATE: 14 BRPM | BODY MASS INDEX: 30.1 KG/M2 | TEMPERATURE: 98 F

## 2023-07-21 PROCEDURE — 0MQN0ZZ REPAIR RIGHT KNEE BURSA AND LIGAMENT, OPEN APPROACH: ICD-10-PCS | Performed by: ORTHOPAEDIC SURGERY

## 2023-07-21 PROCEDURE — 0SBC4ZZ EXCISION OF RIGHT KNEE JOINT, PERCUTANEOUS ENDOSCOPIC APPROACH: ICD-10-PCS | Performed by: ORTHOPAEDIC SURGERY

## 2023-07-21 PROCEDURE — 0SUC0KZ SUPPLEMENT RIGHT KNEE JOINT WITH NONAUTOLOGOUS TISSUE SUBSTITUTE, OPEN APPROACH: ICD-10-PCS | Performed by: ORTHOPAEDIC SURGERY

## 2023-07-21 PROCEDURE — 0MNN4ZZ RELEASE RIGHT KNEE BURSA AND LIGAMENT, PERCUTANEOUS ENDOSCOPIC APPROACH: ICD-10-PCS | Performed by: ORTHOPAEDIC SURGERY

## 2023-07-21 PROCEDURE — 64447 NJX AA&/STRD FEMORAL NRV IMG: CPT | Performed by: ORTHOPAEDIC SURGERY

## 2023-07-21 PROCEDURE — 76000 FLUOROSCOPY <1 HR PHYS/QHP: CPT | Performed by: ORTHOPAEDIC SURGERY

## 2023-07-21 DEVICE — PEEK SWIVELOCK, 3.9X17.9MM
Type: IMPLANTABLE DEVICE | Site: KNEE | Status: FUNCTIONAL
Brand: ARTHREX®

## 2023-07-21 DEVICE — IMPLANTABLE DEVICE: Type: IMPLANTABLE DEVICE | Site: KNEE | Status: FUNCTIONAL

## 2023-07-21 DEVICE — GRAFT SEMITENDINOSIS: Type: IMPLANTABLE DEVICE | Site: KNEE | Status: FUNCTIONAL

## 2023-07-21 RX ORDER — DEXAMETHASONE SODIUM PHOSPHATE 4 MG/ML
VIAL (ML) INJECTION AS NEEDED
Status: DISCONTINUED | OUTPATIENT
Start: 2023-07-21 | End: 2023-07-21 | Stop reason: SURG

## 2023-07-21 RX ORDER — ROPIVACAINE HYDROCHLORIDE 5 MG/ML
INJECTION, SOLUTION EPIDURAL; INFILTRATION; PERINEURAL
Status: COMPLETED | OUTPATIENT
Start: 2023-07-21 | End: 2023-07-21

## 2023-07-21 RX ORDER — CEFAZOLIN SODIUM/WATER 2 G/20 ML
SYRINGE (ML) INTRAVENOUS AS NEEDED
Status: DISCONTINUED | OUTPATIENT
Start: 2023-07-21 | End: 2023-07-21 | Stop reason: SURG

## 2023-07-21 RX ORDER — BUPIVACAINE HYDROCHLORIDE AND EPINEPHRINE 5; 5 MG/ML; UG/ML
INJECTION, SOLUTION PERINEURAL AS NEEDED
Status: DISCONTINUED | OUTPATIENT
Start: 2023-07-21 | End: 2023-07-21 | Stop reason: HOSPADM

## 2023-07-21 RX ORDER — HYDRALAZINE HYDROCHLORIDE 20 MG/ML
5 INJECTION INTRAMUSCULAR; INTRAVENOUS ONCE
Status: COMPLETED | OUTPATIENT
Start: 2023-07-21 | End: 2023-07-21

## 2023-07-21 RX ORDER — MIDAZOLAM HYDROCHLORIDE 1 MG/ML
INJECTION INTRAMUSCULAR; INTRAVENOUS AS NEEDED
Status: DISCONTINUED | OUTPATIENT
Start: 2023-07-21 | End: 2023-07-21 | Stop reason: SURG

## 2023-07-21 RX ORDER — SODIUM CHLORIDE, SODIUM LACTATE, POTASSIUM CHLORIDE, CALCIUM CHLORIDE 600; 310; 30; 20 MG/100ML; MG/100ML; MG/100ML; MG/100ML
INJECTION, SOLUTION INTRAVENOUS CONTINUOUS
Status: DISCONTINUED | OUTPATIENT
Start: 2023-07-21 | End: 2023-07-21

## 2023-07-21 RX ORDER — MORPHINE SULFATE 4 MG/ML
4 INJECTION, SOLUTION INTRAMUSCULAR; INTRAVENOUS EVERY 10 MIN PRN
Status: DISCONTINUED | OUTPATIENT
Start: 2023-07-21 | End: 2023-07-21

## 2023-07-21 RX ORDER — ONDANSETRON 2 MG/ML
INJECTION INTRAMUSCULAR; INTRAVENOUS AS NEEDED
Status: DISCONTINUED | OUTPATIENT
Start: 2023-07-21 | End: 2023-07-21 | Stop reason: SURG

## 2023-07-21 RX ORDER — METOCLOPRAMIDE 10 MG/1
10 TABLET ORAL ONCE
Status: COMPLETED | OUTPATIENT
Start: 2023-07-21 | End: 2023-07-21

## 2023-07-21 RX ORDER — MORPHINE SULFATE 10 MG/ML
6 INJECTION, SOLUTION INTRAMUSCULAR; INTRAVENOUS EVERY 10 MIN PRN
Status: DISCONTINUED | OUTPATIENT
Start: 2023-07-21 | End: 2023-07-21

## 2023-07-21 RX ORDER — HYDROMORPHONE HYDROCHLORIDE 1 MG/ML
0.4 INJECTION, SOLUTION INTRAMUSCULAR; INTRAVENOUS; SUBCUTANEOUS EVERY 5 MIN PRN
Status: DISCONTINUED | OUTPATIENT
Start: 2023-07-21 | End: 2023-07-21

## 2023-07-21 RX ORDER — DEXAMETHASONE SODIUM PHOSPHATE 10 MG/ML
INJECTION, SOLUTION INTRAMUSCULAR; INTRAVENOUS
Status: COMPLETED | OUTPATIENT
Start: 2023-07-21 | End: 2023-07-21

## 2023-07-21 RX ORDER — LIDOCAINE HYDROCHLORIDE 10 MG/ML
INJECTION, SOLUTION EPIDURAL; INFILTRATION; INTRACAUDAL; PERINEURAL AS NEEDED
Status: DISCONTINUED | OUTPATIENT
Start: 2023-07-21 | End: 2023-07-21 | Stop reason: SURG

## 2023-07-21 RX ORDER — ACETAMINOPHEN 500 MG
1000 TABLET ORAL ONCE
Status: COMPLETED | OUTPATIENT
Start: 2023-07-21 | End: 2023-07-21

## 2023-07-21 RX ORDER — FAMOTIDINE 20 MG/1
20 TABLET, FILM COATED ORAL ONCE
Status: COMPLETED | OUTPATIENT
Start: 2023-07-21 | End: 2023-07-21

## 2023-07-21 RX ORDER — HYDROMORPHONE HYDROCHLORIDE 1 MG/ML
0.6 INJECTION, SOLUTION INTRAMUSCULAR; INTRAVENOUS; SUBCUTANEOUS EVERY 5 MIN PRN
Status: DISCONTINUED | OUTPATIENT
Start: 2023-07-21 | End: 2023-07-21

## 2023-07-21 RX ORDER — KETOROLAC TROMETHAMINE 30 MG/ML
INJECTION, SOLUTION INTRAMUSCULAR; INTRAVENOUS AS NEEDED
Status: DISCONTINUED | OUTPATIENT
Start: 2023-07-21 | End: 2023-07-21 | Stop reason: SURG

## 2023-07-21 RX ORDER — HYDROMORPHONE HYDROCHLORIDE 1 MG/ML
0.2 INJECTION, SOLUTION INTRAMUSCULAR; INTRAVENOUS; SUBCUTANEOUS EVERY 5 MIN PRN
Status: DISCONTINUED | OUTPATIENT
Start: 2023-07-21 | End: 2023-07-21

## 2023-07-21 RX ORDER — NALOXONE HYDROCHLORIDE 0.4 MG/ML
80 INJECTION, SOLUTION INTRAMUSCULAR; INTRAVENOUS; SUBCUTANEOUS AS NEEDED
Status: DISCONTINUED | OUTPATIENT
Start: 2023-07-21 | End: 2023-07-21

## 2023-07-21 RX ORDER — CEFAZOLIN SODIUM/WATER 2 G/20 ML
2 SYRINGE (ML) INTRAVENOUS ONCE
Status: DISCONTINUED | OUTPATIENT
Start: 2023-07-21 | End: 2023-07-21 | Stop reason: HOSPADM

## 2023-07-21 RX ORDER — MORPHINE SULFATE 4 MG/ML
2 INJECTION, SOLUTION INTRAMUSCULAR; INTRAVENOUS EVERY 10 MIN PRN
Status: DISCONTINUED | OUTPATIENT
Start: 2023-07-21 | End: 2023-07-21

## 2023-07-21 RX ADMIN — SODIUM CHLORIDE, SODIUM LACTATE, POTASSIUM CHLORIDE, CALCIUM CHLORIDE: 600; 310; 30; 20 INJECTION, SOLUTION INTRAVENOUS at 13:53:00

## 2023-07-21 RX ADMIN — DEXAMETHASONE SODIUM PHOSPHATE 8 MG: 4 MG/ML VIAL (ML) INJECTION at 13:53:00

## 2023-07-21 RX ADMIN — ROPIVACAINE HYDROCHLORIDE 30 ML: 5 INJECTION, SOLUTION EPIDURAL; INFILTRATION; PERINEURAL at 13:35:00

## 2023-07-21 RX ADMIN — KETOROLAC TROMETHAMINE 30 MG: 30 INJECTION, SOLUTION INTRAMUSCULAR; INTRAVENOUS at 17:57:00

## 2023-07-21 RX ADMIN — DEXAMETHASONE SODIUM PHOSPHATE 10 MG: 10 INJECTION, SOLUTION INTRAMUSCULAR; INTRAVENOUS at 13:35:00

## 2023-07-21 RX ADMIN — MIDAZOLAM HYDROCHLORIDE 2 MG: 1 INJECTION INTRAMUSCULAR; INTRAVENOUS at 13:53:00

## 2023-07-21 RX ADMIN — MIDAZOLAM HYDROCHLORIDE 2 MG: 1 INJECTION INTRAMUSCULAR; INTRAVENOUS at 13:34:00

## 2023-07-21 RX ADMIN — LIDOCAINE HYDROCHLORIDE 50 MG: 10 INJECTION, SOLUTION EPIDURAL; INFILTRATION; INTRACAUDAL; PERINEURAL at 13:53:00

## 2023-07-21 RX ADMIN — CEFAZOLIN SODIUM/WATER 2 G: 2 G/20 ML SYRINGE (ML) INTRAVENOUS at 14:04:00

## 2023-07-21 RX ADMIN — ONDANSETRON 4 MG: 2 INJECTION INTRAMUSCULAR; INTRAVENOUS at 13:53:00

## 2023-07-21 NOTE — H&P
ORTHO VISIT NOTE       Bertha Rubio - MRN: [id number]         HISTORY OF PRESENT ILLNESS:   The patient is a 41year old year old male who follows up today for CT and MRI review and further treatment discussion regarding his right knee pain. Reports continued 7-8 out of 10 intensity located along the lateral joint line of the knee as well as a sense of popping and instability of the knee. He has a history of multiple prior patellar dislocations. Denies new complaints. History reviewed. No pertinent past medical history. History reviewed. No pertinent surgical history. Current Outpatient Medications:     Meloxicam (MOBIC) 15 MG Oral Tab, Take 1 tablet (15 mg total) by mouth daily as needed for Pain. Take with food. , Disp: 30 tablet, Rfl: 1    risperiDONE 0.25 MG Oral Tab, Take 0.25 mg by mouth 2 (two) times daily. , Disp: , Rfl:     Albuterol Sulfate  (90 Base) MCG/ACT Inhalation Aero Soln, Inhale 2 puffs into the lungs every 6 (six) hours as needed for Wheezing. (Patient not taking: Reported on 6/24/2020 ), Disp: 1 Inhaler, Rfl: 0    azithromycin (ZITHROMAX Z-JOSUE) 250 MG Oral Tab, Take 1 tablet (250 mg total) by mouth daily. (Patient not taking: Reported on 6/24/2020 ), Disp: 1 Package, Rfl: 0     No Known Allergies     Social History    Tobacco Use      Smoking status: Never      Smokeless tobacco: Never    Alcohol use: Yes    Drug use: No        PHYSICAL EXAMINATION:   No physical exam performed during today's telephone visit. IMAGING: X-ray report of the patient's right knee reviewed and demonstrates no significant osseous abnormalities, possible patella mimi. MRI of the patient's right knee reviewed and demonstrates complex bucket-handle lateral meniscal tear, unstable OCD lesion of the lateral distal femoral condyle measuring 2 cm x 1.7 cm, moderate lateral compartment chondropathy, large effusion, and loose body in the posterior knee.      CT of his right knee reviewed and demonstrates moderate tricompartmental degenerative changes, unstable osteochondral free fragment along the lateral weightbearing surface of the lateral femoral condyle with very poor quality and underlying cystic bone changes, and small scattered loose bodies throughout the suprapatellar pouch. IMPRESSION:  Patellar instability of right knee  (primary encounter diagnosis)  Loose body in knee, right knee  Bucket-handle tear of lateral meniscus of right knee as current injury, subsequent encounter  Osteochondral lesion        PLAN: The diagnosis and expected treatment course were extensively discussed with him. I recommend surgical management as he has a very large full-thickness chondral lesion giving him significant pain. Surgery would include a knee arthroscopy with lateral meniscal repair versus partial lateral meniscectomy, possible lateral release, and possible synovectomy along with an open osteochondral allograft implantation to the lateral distal femoral condyle and medial patellofemoral ligament reconstruction. Risks, benefits, alternatives surgery discussed with him. Risk discussed include bleeding, infection, stiffness, nerve damage, blood clots, nonhealing of the osteochondral allograft, nonhealing of the medial patellofemoral ligament, redislocation of the patella, pain in the lateral compartment, nonhealing of the meniscal repair performed, and need for future surgery including need for future arthroplasty procedures. He understands the risks and wishes to proceed with surgery. My surgical scheduler's information was given to him and he will call to schedule surgery for next available at his convenience. All questions answered. Maxene Homans Nelta Ano, MD.    Above copied H&P reviewed, no significant changes noted.

## 2023-07-21 NOTE — ANESTHESIA PROCEDURE NOTES
Peripheral Block    Date/Time: 7/21/2023 1:35 PM    Performed by: Enzo Choi CRNA  Authorized by: Edna Griffith MD      General Information and Staff    Start Time:  7/21/2023 1:35 PM  End Time:  7/21/2023 1:41 PM  CRNA:  Enzo Choi CRNA  Performed by:  CRNA  Patient Location:  PACU    Block Placement: Pre Induction  Site Identification: real time ultrasound guided and image stored and retrievable      Reason for Block: at surgeon's request and post-op pain management    Preanesthetic Checklist: 2 patient identifers, IV checked, risks and benefits discussed, monitors and equipment checked, pre-op evaluation, timeout performed, anesthesia consent and sterile technique used      Procedure Details    Patient Position:  Supine  Prep: ChloraPrep    Monitoring:  Cardiac monitor  Block Type:  Saphenous  Injection Technique:  Single-shot    Needle    Needle Type:   Reason for Ultrasound Use: appropriate spread of the medication was noted in real time and no ultrasound evidence of intravascular and/or intraneural injection            Assessment    Injection Assessment:  Good spread noted, incremental injection, low pressure, local visualized surrounding nerve on ultrasound, negative aspiration for heme and no pain on injection  Paresthesia Pain:  None  Heart Rate Change: No        Medications  7/21/2023 1:35 PM  dexamethasone (DECADRON) PF injection 10 mg/ml - Injection   10 mg - 7/21/2023 1:35:00 PM  ropivacaine (NAROPIN) injection 0.5% - Infiltration   30 mL - 7/21/2023 1:35:00 PM    Additional Comments

## 2023-07-21 NOTE — ANESTHESIA POSTPROCEDURE EVALUATION
Patient: Zeyad Sosa    Procedure Summary       Date: 07/21/23 Room / Location: [address] [place] [address] MAIN OR 09 / [address] [facility] MAIN OR    Anesthesia Start: 3852 Anesthesia Stop:     Procedures:       Right knee arthroscopy with lateral meniscal repair versus partial lateral meniscectomy, removal of loose body, and possible synovectomy, right knee open medial patellofemoral ligament reconstruction and lateral distal femoral osteochondral allograft implantation (Right: Knee)      PATELLA FEMORAL RECONSTRUCTION (Right: Knee) Diagnosis: (Patellar instability right knee, loose body right knee, bucket handle tear lateral meniscus right knee, osteochondral lesion)    Surgeons: Ginny Dasilva MD Anesthesiologist: Jose Santo MD    Anesthesia Type: general, regional ASA Status: 2            Anesthesia Type: general, regional    Vitals Value Taken Time   /778 07/21/23 1818   Temp 98 07/21/23 1818   Pulse 85 07/21/23 1817   Resp 16 07/21/23 1817   SpO2 100 % 07/21/23 1817   Vitals shown include unvalidated device data.     [address] [place] [address] AN Post Evaluation:   Patient Evaluated in PACU  Patient Participation: complete - patient participated  Level of Consciousness: awake  Pain Management: adequate  Airway Patency:patent  Dental exam unchanged from preop  Yes    Cardiovascular Status: acceptable  Respiratory Status: acceptable  Postoperative Hydration acceptable      Farrukh Jarvis MD  7/21/2023 6:18 PM

## (undated) DEVICE — SUT VICRYL 2-0 FS-1 J443H

## (undated) DEVICE — Device

## (undated) DEVICE — SUCTION CANISTER, 3000CC,SAFELINER: Brand: DEROYAL

## (undated) DEVICE — GAMMEX® PI HYBRID SIZE 8, STERILE POWDER-FREE SURGICAL GLOVE, POLYISOPRENE AND NEOPRENE BLEND: Brand: GAMMEX

## (undated) DEVICE — GAMMEX® PI HYBRID SIZE 7.5, STERILE POWDER-FREE SURGICAL GLOVE, POLYISOPRENE AND NEOPRENE BLEND: Brand: GAMMEX

## (undated) DEVICE — NEEDLE HPO 18GA 1.5IN ECLPS

## (undated) DEVICE — MEDI-VAC NON-CONDUCTIVE SUCTION TUBING: Brand: CARDINAL HEALTH

## (undated) DEVICE — LOWER EXTREMITY: Brand: MEDLINE INDUSTRIES, INC.

## (undated) DEVICE — SUT ETHILON 3-0 PS-2 1669H

## (undated) DEVICE — GAMMEX® PI HYBRID SIZE 7, STERILE POWDER-FREE SURGICAL GLOVE, POLYISOPRENE AND NEOPRENE BLEND: Brand: GAMMEX

## (undated) DEVICE — DRAPE SRG 90X60IN BCK TBL CVR

## (undated) DEVICE — PRECISION OFFSET (9.0 X 0.51 X 25.0MM)

## (undated) DEVICE — SUT MONOCRYL 3-0 PS-2 Y497G

## (undated) DEVICE — PAD,ABDOMINAL,8"X7.5",STERILE,LF,1/PK: Brand: MEDLINE

## (undated) DEVICE — DISSECTOR ESCP COOLCUT 4MM CRV

## (undated) DEVICE — HANDPIECE SET WITH HIGH FLOW TIP AND SUCTION TUBE: Brand: INTERPULSE

## (undated) DEVICE — TOWEL SURG OR 17X30IN BLUE

## (undated) DEVICE — COTTON UNDERCAST PADDING,REGULAR FINISH: Brand: WEBRIL

## (undated) DEVICE — DERMABOND CLOSURE 0.7ML TOPICL

## (undated) DEVICE — INTENT TO BE USED WITH SUTURE MATERIAL FOR TISSUE CLOSURE: Brand: RICHARD-ALLAN® NEEDLE 3/8 CIRCLE TROCAR

## (undated) DEVICE — SUT VICRYL 0 CP-1 J267H

## (undated) DEVICE — C-ARMOR C-ARM EQUIPMENT COVERS CLEAR STERILE UNIVERSAL FIT 12 PER CASE: Brand: C-ARMOR

## (undated) DEVICE — 60 ML SYRINGE LUER-LOCK TIP: Brand: MONOJECT

## (undated) DEVICE — KIT ACL TRANS AR-1898S

## (undated) DEVICE — SPINOCAN® 18 GA. X 3-1/2 IN. (90 MM) SPINAL NEEDLE: Brand: SPINOCAN®

## (undated) DEVICE — TUBING IRR 16FT CNT WV 3 ASCP

## (undated) DEVICE — STERILE TETRA-FLEX CF, ELASTIC BANDAGE, 4" X 5.5YD: Brand: TETRA-FLEX™CF

## (undated) DEVICE — ANCHOR SUT 3MM 2 SUT TK FBRWR
Type: IMPLANTABLE DEVICE | Site: KNEE | Status: NON-FUNCTIONAL
Removed: 2023-07-21

## (undated) DEVICE — BURR SHVR COOLCUT 13CM 4MM 8

## (undated) DEVICE — C-ARM: Brand: UNBRANDED

## (undated) DEVICE — GAMMEX® PI HYBRID SIZE 9, STERILE POWDER-FREE SURGICAL GLOVE, POLYISOPRENE AND NEOPRENE BLEND: Brand: GAMMEX

## (undated) DEVICE — WEBRIL COTTON UNDERCAST PADDING: Brand: WEBRIL

## (undated) DEVICE — SUT MONOCRYL 3-0 PS-1 Y936H

## (undated) DEVICE — AMBIENT SUPER TURBOVAC 90 IFS: Brand: COBLATION

## (undated) DEVICE — GOWN SURG AERO BLUE PERF XXXLG

## (undated) DEVICE — SOLUTION  .9 3000ML

## (undated) DEVICE — DRAPE SHEET LARGE 76X55

## (undated) NOTE — LETTER
Date & Time: 12/31/2018, 5:16 AM  Patient: Abby Annie    Dear Abby Valencia,    We are unable to reach you by phone and would like to follow up with you regarding your visit to the Emergency Department.         Please contact the Emergency Depart

## (undated) NOTE — ED AVS SNAPSHOT
Amadeo Jaimes   MRN: [id number]    Department:  [facility] Emergency Department   Date of Visit:  12/26/2018           Disclosure     Insurance plans vary and the physician(s) referred by the ER may not be covered by your plan.  Please contac CARE PHYSICIAN AT ONCE OR RETURN IMMEDIATELY TO THE EMERGENCY DEPARTMENT. If you have been prescribed any medication(s), please fill your prescription right away and begin taking the medication(s) as directed.   If you believe that any of the medications